# Patient Record
Sex: FEMALE | Race: WHITE | Employment: FULL TIME | URBAN - METROPOLITAN AREA
[De-identification: names, ages, dates, MRNs, and addresses within clinical notes are randomized per-mention and may not be internally consistent; named-entity substitution may affect disease eponyms.]

---

## 2024-10-12 LAB — HBA1C MFR BLD HPLC: 12 %

## 2024-10-17 ENCOUNTER — HOSPITAL ENCOUNTER (EMERGENCY)
Facility: HOSPITAL | Age: 34
Discharge: HOME | End: 2024-10-18
Attending: EMERGENCY MEDICINE
Payer: COMMERCIAL

## 2024-10-17 DIAGNOSIS — U07.1 COVID-19: ICD-10-CM

## 2024-10-17 DIAGNOSIS — E11.65 HYPERGLYCEMIA DUE TO DIABETES MELLITUS (HCC): Primary | ICD-10-CM

## 2024-10-17 LAB
BASOPHILS # BLD: 0 K/UL (ref 0–0.1)
BASOPHILS NFR BLD: 0 % (ref 0–1)
DIFFERENTIAL METHOD BLD: ABNORMAL
EOSINOPHIL # BLD: 0.1 K/UL (ref 0–0.4)
EOSINOPHIL NFR BLD: 4 % (ref 0–7)
ERYTHROCYTE [DISTWIDTH] IN BLOOD BY AUTOMATED COUNT: 12.2 % (ref 11.5–14.5)
GLUCOSE BLD STRIP.AUTO-MCNC: 551 MG/DL (ref 65–117)
HCT VFR BLD AUTO: 37.7 % (ref 35–47)
HGB BLD-MCNC: 13.1 G/DL (ref 11.5–16)
IMM GRANULOCYTES # BLD AUTO: 0 K/UL (ref 0–0.04)
IMM GRANULOCYTES NFR BLD AUTO: 0 % (ref 0–0.5)
LYMPHOCYTES # BLD: 1.7 K/UL (ref 0.8–3.5)
LYMPHOCYTES NFR BLD: 45 % (ref 12–49)
MCH RBC QN AUTO: 29 PG (ref 26–34)
MCHC RBC AUTO-ENTMCNC: 34.7 G/DL (ref 30–36.5)
MCV RBC AUTO: 83.4 FL (ref 80–99)
MONOCYTES # BLD: 0.3 K/UL (ref 0–1)
MONOCYTES NFR BLD: 8 % (ref 5–13)
NEUTS SEG # BLD: 1.6 K/UL (ref 1.8–8)
NEUTS SEG NFR BLD: 43 % (ref 32–75)
NRBC # BLD: 0 K/UL (ref 0–0.01)
NRBC BLD-RTO: 0 PER 100 WBC
PLATELET # BLD AUTO: 298 K/UL (ref 150–400)
PMV BLD AUTO: 9.6 FL (ref 8.9–12.9)
RBC # BLD AUTO: 4.52 M/UL (ref 3.8–5.2)
SERVICE CMNT-IMP: ABNORMAL
WBC # BLD AUTO: 3.8 K/UL (ref 3.6–11)

## 2024-10-17 PROCEDURE — 87636 SARSCOV2 & INF A&B AMP PRB: CPT

## 2024-10-17 PROCEDURE — 36415 COLL VENOUS BLD VENIPUNCTURE: CPT

## 2024-10-17 PROCEDURE — 82962 GLUCOSE BLOOD TEST: CPT

## 2024-10-17 PROCEDURE — 85025 COMPLETE CBC W/AUTO DIFF WBC: CPT

## 2024-10-17 PROCEDURE — 80053 COMPREHEN METABOLIC PANEL: CPT

## 2024-10-17 PROCEDURE — 99284 EMERGENCY DEPT VISIT MOD MDM: CPT

## 2024-10-17 RX ORDER — 0.9 % SODIUM CHLORIDE 0.9 %
500 INTRAVENOUS SOLUTION INTRAVENOUS ONCE
Status: COMPLETED | OUTPATIENT
Start: 2024-10-17 | End: 2024-10-18

## 2024-10-17 ASSESSMENT — PAIN SCALES - GENERAL: PAINLEVEL_OUTOF10: 0

## 2024-10-18 VITALS
BODY MASS INDEX: 24.8 KG/M2 | TEMPERATURE: 98.1 F | DIASTOLIC BLOOD PRESSURE: 65 MMHG | HEIGHT: 63 IN | SYSTOLIC BLOOD PRESSURE: 97 MMHG | OXYGEN SATURATION: 97 % | WEIGHT: 140 LBS | RESPIRATION RATE: 17 BRPM | HEART RATE: 82 BPM

## 2024-10-18 LAB
ALBUMIN SERPL-MCNC: 3.7 G/DL (ref 3.5–5)
ALBUMIN/GLOB SERPL: 1.2 (ref 1.1–2.2)
ALP SERPL-CCNC: 54 U/L (ref 45–117)
ALT SERPL-CCNC: 14 U/L (ref 12–78)
ANION GAP SERPL CALC-SCNC: 4 MMOL/L (ref 2–12)
APPEARANCE UR: CLEAR
AST SERPL-CCNC: 8 U/L (ref 15–37)
BACTERIA URNS QL MICRO: NEGATIVE /HPF
BILIRUB SERPL-MCNC: 0.3 MG/DL (ref 0.2–1)
BILIRUB UR QL: NEGATIVE
BUN SERPL-MCNC: 8 MG/DL (ref 6–20)
BUN/CREAT SERPL: 9 (ref 12–20)
CALCIUM SERPL-MCNC: 9.1 MG/DL (ref 8.5–10.1)
CHLORIDE SERPL-SCNC: 98 MMOL/L (ref 97–108)
CO2 SERPL-SCNC: 30 MMOL/L (ref 21–32)
COLOR UR: ABNORMAL
CREAT SERPL-MCNC: 0.9 MG/DL (ref 0.55–1.02)
EPITH CASTS URNS QL MICRO: ABNORMAL /LPF
FLUAV RNA SPEC QL NAA+PROBE: NOT DETECTED
FLUBV RNA SPEC QL NAA+PROBE: NOT DETECTED
GLOBULIN SER CALC-MCNC: 3.2 G/DL (ref 2–4)
GLUCOSE BLD STRIP.AUTO-MCNC: 222 MG/DL (ref 65–117)
GLUCOSE BLD STRIP.AUTO-MCNC: 513 MG/DL (ref 65–117)
GLUCOSE SERPL-MCNC: 571 MG/DL (ref 65–100)
GLUCOSE UR STRIP.AUTO-MCNC: >1000 MG/DL
HCG UR QL: NEGATIVE
HGB UR QL STRIP: NEGATIVE
KETONES UR QL STRIP.AUTO: NEGATIVE MG/DL
LEUKOCYTE ESTERASE UR QL STRIP.AUTO: NEGATIVE
NITRITE UR QL STRIP.AUTO: NEGATIVE
PH UR STRIP: 8 (ref 5–8)
POTASSIUM SERPL-SCNC: 4.3 MMOL/L (ref 3.5–5.1)
PROT SERPL-MCNC: 6.9 G/DL (ref 6.4–8.2)
PROT UR STRIP-MCNC: NEGATIVE MG/DL
RBC #/AREA URNS HPF: ABNORMAL /HPF (ref 0–5)
SARS-COV-2 RNA RESP QL NAA+PROBE: DETECTED
SERVICE CMNT-IMP: ABNORMAL
SERVICE CMNT-IMP: ABNORMAL
SODIUM SERPL-SCNC: 132 MMOL/L (ref 136–145)
SOURCE: ABNORMAL
SP GR UR REFRACTOMETRY: 1.01 (ref 1–1.03)
URINE CULTURE IF INDICATED: ABNORMAL
UROBILINOGEN UR QL STRIP.AUTO: 0.2 EU/DL (ref 0.2–1)
WBC URNS QL MICRO: ABNORMAL /HPF (ref 0–4)

## 2024-10-18 PROCEDURE — 96361 HYDRATE IV INFUSION ADD-ON: CPT

## 2024-10-18 PROCEDURE — 6370000000 HC RX 637 (ALT 250 FOR IP): Performed by: EMERGENCY MEDICINE

## 2024-10-18 PROCEDURE — 81025 URINE PREGNANCY TEST: CPT

## 2024-10-18 PROCEDURE — 82962 GLUCOSE BLOOD TEST: CPT

## 2024-10-18 PROCEDURE — 96374 THER/PROPH/DIAG INJ IV PUSH: CPT

## 2024-10-18 PROCEDURE — 2580000003 HC RX 258: Performed by: EMERGENCY MEDICINE

## 2024-10-18 PROCEDURE — 81001 URINALYSIS AUTO W/SCOPE: CPT

## 2024-10-18 RX ADMIN — SODIUM CHLORIDE 500 ML: 9 INJECTION, SOLUTION INTRAVENOUS at 00:23

## 2024-10-18 RX ADMIN — INSULIN HUMAN 10 UNITS: 100 INJECTION, SOLUTION PARENTERAL at 00:24

## 2024-10-18 ASSESSMENT — ENCOUNTER SYMPTOMS
VOMITING: 0
NAUSEA: 0
ABDOMINAL PAIN: 0
DIARRHEA: 0
SHORTNESS OF BREATH: 0

## 2024-10-18 NOTE — DISCHARGE INSTRUCTIONS
It was a pleasure taking care of you in our Emergency Department today.  We know that when you come to [facility], you are entrusting us with your health, comfort, and safety.  Our physicians and nurses honor that trust, and truly appreciate the opportunity to care for you and your loved ones.      We also value your feedback.  If you receive a survey about your Emergency Department experience today, please fill it out.  We care about our patients' feedback, and we listen to what you have to say.  Thank you!      Dr. Luz Maria Loving MD.

## 2024-10-18 NOTE — ED PROVIDER NOTES
EMERGENCY DEPARTMENT HISTORY AND PHYSICAL EXAM     ----------------------------------------------------------------------------  Please note that this dictation was completed with [employer], the computer voice recognition software.  Quite often unanticipated grammatical, syntax, homophones, and other interpretive errors are inadvertently transcribed by the computer software.  Please disregard these errors.  Please excuse any errors that have escaped final proofreading  ----------------------------------------------------------------------------      Date: 10/17/2024  Patient Name: Kailyn Salcido      HISTORY OF PRESENT ILLNESS     Chief Complaint   Patient presents with    Hyperglycemia     Pt arrives w cc of hyperglycemia, was dx with DM on Tuesday, unsure if type I or II. Pt had blood work done Tuesday, her A1C was 12, BG was 267, pt has been taking BG 3x daily at home, has not gotten a reading <311. Pt was Rx long acting insulin, last used around 2100. Pt stated she got a reading >600 this evening. Denies N/V/abd pain. Resp WDL       History obtainted from:  Patient    Other independent source of history: family    HPI: Kailyn Salcido is a 34 y.o. female, with significant pmhx of newly diagnosed diabetes, who presents via private vehicle  to the ED with c/o generalized fatigue with elevated blood sugar readings over 500 at home.  Was given long-acting insulin by her primary care doctor but no short acting.  Denies nausea, vomiting, chest pain or shortness of breath.  Reports increased thirst with Baez urea as well      PCP: Lona Corcoran MD    Allergy List:   Allergies   Allergen Reactions    Sulfa Antibiotics Hives         CURRENT MEDICATIONS      Discharge Medication List as of 10/18/2024  1:56 AM        CONTINUE these medications which have NOT CHANGED    Details   bismuth subsalicylate (PEPTO BISMOL) 262 MG/15ML suspension Take 30 mLs by mouth every 4 hours as neededHistorical Med      busPIRone

## 2024-11-06 ENCOUNTER — OFFICE VISIT (OUTPATIENT)
Age: 34
End: 2024-11-06
Payer: COMMERCIAL

## 2024-11-06 DIAGNOSIS — E11.65 TYPE 2 DIABETES MELLITUS WITH HYPERGLYCEMIA, UNSPECIFIED WHETHER LONG TERM INSULIN USE (HCC): Primary | ICD-10-CM

## 2024-11-06 PROCEDURE — G0108 DIAB MANAGE TRN  PER INDIV: HCPCS

## 2024-11-11 ENCOUNTER — TELEPHONE (OUTPATIENT)
Age: 34
End: 2024-11-11

## 2024-11-11 NOTE — TELEPHONE ENCOUNTER
----- Message from Dr. Lona Romero MD sent at 11/10/2024  7:38 PM EST -----  Patient needs appointment last seen in 2022 please check if still my patient  ----- Message -----  From: Paige Ndiaye RN  Sent: 11/7/2024   5:04 PM EST  To: Lona Romero MD

## 2024-11-13 ENCOUNTER — TELEPHONE (OUTPATIENT)
Age: 34
End: 2024-11-13

## 2024-11-27 ENCOUNTER — TELEPHONE (OUTPATIENT)
Age: 34
End: 2024-11-27

## 2024-11-27 NOTE — TELEPHONE ENCOUNTER
LVM informing pt Dr. Rivera has sent you a [employer] message that you have not yet read. Please read this as soon as possible and if you are unable to get into [employer] to read the message then please call me back and I'm happy to read the message to you.

## 2024-12-17 ENCOUNTER — OFFICE VISIT (OUTPATIENT)
Age: 34
End: 2024-12-17
Payer: COMMERCIAL

## 2024-12-17 VITALS
HEIGHT: 63 IN | WEIGHT: 125.4 LBS | BODY MASS INDEX: 22.22 KG/M2 | HEART RATE: 87 BPM | DIASTOLIC BLOOD PRESSURE: 60 MMHG | SYSTOLIC BLOOD PRESSURE: 94 MMHG

## 2024-12-17 DIAGNOSIS — E13.9 TYPE 1.5 DIABETES, MANAGED AS TYPE 1 (HCC): Primary | ICD-10-CM

## 2024-12-17 PROCEDURE — 3046F HEMOGLOBIN A1C LEVEL >9.0%: CPT | Performed by: INTERNAL MEDICINE

## 2024-12-17 PROCEDURE — 2022F DILAT RTA XM EVC RTNOPTHY: CPT | Performed by: INTERNAL MEDICINE

## 2024-12-17 PROCEDURE — G8427 DOCREV CUR MEDS BY ELIG CLIN: HCPCS | Performed by: INTERNAL MEDICINE

## 2024-12-17 PROCEDURE — G8484 FLU IMMUNIZE NO ADMIN: HCPCS | Performed by: INTERNAL MEDICINE

## 2024-12-17 PROCEDURE — G8420 CALC BMI NORM PARAMETERS: HCPCS | Performed by: INTERNAL MEDICINE

## 2024-12-17 PROCEDURE — 95251 CONT GLUC MNTR ANALYSIS I&R: CPT | Performed by: INTERNAL MEDICINE

## 2024-12-17 PROCEDURE — 99205 OFFICE O/P NEW HI 60 MIN: CPT | Performed by: INTERNAL MEDICINE

## 2024-12-17 PROCEDURE — 1036F TOBACCO NON-USER: CPT | Performed by: INTERNAL MEDICINE

## 2024-12-17 RX ORDER — ACYCLOVIR 400 MG/1
TABLET ORAL
Qty: 9 EACH | Refills: 3 | Status: SHIPPED | OUTPATIENT
Start: 2024-12-17

## 2024-12-17 RX ORDER — INSULIN GLARGINE 100 [IU]/ML
INJECTION, SOLUTION SUBCUTANEOUS
COMMUNITY

## 2024-12-17 RX ORDER — INSULIN LISPRO 100 [IU]/ML
INJECTION, SOLUTION INTRAVENOUS; SUBCUTANEOUS 3 TIMES DAILY
COMMUNITY

## 2024-12-17 NOTE — PATIENT INSTRUCTIONS
1) Your overall control looks pretty good but I am concerned your lantus may be a little more than you need so decrease your dose to 14 units at night and see if this keeps you  in the morning.  If you have 2 or more readings under 80 in a week in the morning, then decrease your lantus to 13 units.      2) Try dosing humalog 1 unit for 15 grams of carbs 5-10 minutes before you eat to prevent your sugar from going up.  Check your dexcom at 2 hours after you eat and as long as it's staying under 180, then this is a good dose of humalog to cover your food.  If you don't eat at least 30 grams of carbs, then you don't need to take any humalog unless your sugar is over 180 and then follow the scale below:    Blood sugar  Insulin dose          181-240  2 units  241-300  3 units  301-360  4 units  361-420  5 units  Over 420  6 units          3) Now that you can share your dexcom data with me, let me know over [employer] if you want me to look at your data and I can write back to you with any changes if having lows under 80 more than 2 times a week or you are going over 250 3-4 times or more a week.      4) Let me know when you need refills.    5) We will just draw a Hemoglobin A1c in the office next time and you won't need any additional labs.

## 2024-12-17 NOTE — PROGRESS NOTES
under 80 in a week in the morning, then decrease your lantus to 13 units.      2) Try dosing humalog 1 unit for 15 grams of carbs 5-10 minutes before you eat to prevent your sugar from going up.  Check your dexcom at 2 hours after you eat and as long as it's staying under 180, then this is a good dose of humalog to cover your food.  If you don't eat at least 30 grams of carbs, then you don't need to take any humalog unless your sugar is over 180 and then follow the scale below:    Blood sugar  Insulin dose          181-240  2 units  241-300  3 units  301-360  4 units  361-420  5 units  Over 420  6 units          3) Now that you can share your dexcom data with me, let me know over [employer] if you want me to look at your data and I can write back to you with any changes if having lows under 80 more than 2 times a week or you are going over 250 3-4 times or more a week.      4) Let me know when you need refills.    5) We will just draw a Hemoglobin A1c in the office next time and you won't need any additional labs.            Return 2/28/25 at 12:10pm.    I spent a total of 62 minutes in both face-to-face and in non-face-to-face activities for the visit on the date of this encounter.        Copy sent to:  Wendy Malik APRN - NP

## 2024-12-20 ENCOUNTER — OFFICE VISIT (OUTPATIENT)
Age: 34
End: 2024-12-20
Payer: COMMERCIAL

## 2024-12-20 DIAGNOSIS — E11.65 TYPE 2 DIABETES MELLITUS WITH HYPERGLYCEMIA, UNSPECIFIED WHETHER LONG TERM INSULIN USE (HCC): Primary | ICD-10-CM

## 2024-12-20 PROCEDURE — G0108 DIAB MANAGE TRN  PER INDIV: HCPCS

## 2024-12-20 NOTE — PROGRESS NOTES
[facility]  Diabetes Self-Management Education & Support Program  Encounter Note      SUMMARY  Diabetes self-care management training was completed related to healthy eating: carb counting. The participant will return on January 17 to continue DSMES related to reducing risks and taking medications. The participant did identify SMART Goal(s) and will practice knowledge and skills related to healthy eating to improve diabetes self-management.      EVALUATION:  Ms. Salcido can recognize carbohydrates, proteins, and fats using food models. She can verbalize reading a food label. She understands and can verbalize basic carb counting. She verbalized understanding of ADA recommendations for alcohol use. She can verbalize hypoglycemia protocols/Rule of 15. Per provider, participant diagnosed as T1DM. Diabetes educational materials provided to participant.    RECOMMENDATIONS:  Practice reading food labels  Measure food portions for meals  Work on SMART goal  Continue to take DM meds/insulin as prescribed    TOPICS DISCUSSED TODAY:  WHAT CAN I EAT? 60    Next provider visit is scheduled for   Future Appointments         Provider Specialty Dept Phone    1/17/2025 3:45 PM Paige Ndiaye RN Diabetes Services [phone]    2/28/2025 12:10 PM Isaias Rivera MD Endocrinology [phone]              SMART GOAL(S)  Practice healthy eating self-care behavior by counting carbs for each meal, daily,  over the next week.  ACHIEVEMENT OF GOAL(S) : 25-49%       DATE DSMES TOPIC EVALUATION     12/20/2024 WHAT CAN I EAT?     Nutritional terms & tools    Carbohydrate Counting   Reading food labels   Eating in [employer]  ADA alcohol recommendations  Free apps   Prescribed Carb ratio, Sensitivity/Correction ratio  Pregnancy recommendations   Maintaining A1c/BG targets per provider recommendation  Eye exam per provider recommendation     The participant   Reads food labels in choosing acceptable foods:

## 2025-01-01 RX ORDER — INSULIN GLARGINE 100 [IU]/ML
INJECTION, SOLUTION SUBCUTANEOUS
Qty: 15 ML | Refills: 3 | Status: SHIPPED | OUTPATIENT
Start: 2025-01-01

## 2025-01-16 RX ORDER — INSULIN GLARGINE 100 [IU]/ML
INJECTION, SOLUTION SUBCUTANEOUS
Qty: 15 ML | Refills: 3 | Status: SHIPPED | OUTPATIENT
Start: 2025-01-16

## 2025-01-20 DIAGNOSIS — E13.9 TYPE 1.5 DIABETES, MANAGED AS TYPE 1 (HCC): Primary | ICD-10-CM

## 2025-01-20 RX ORDER — ACYCLOVIR 400 MG/1
TABLET ORAL
Qty: 9 EACH | Refills: 3 | Status: SHIPPED | OUTPATIENT
Start: 2025-01-20

## 2025-01-23 ENCOUNTER — TELEPHONE (OUTPATIENT)
Age: 35
End: 2025-01-23

## 2025-01-23 NOTE — TELEPHONE ENCOUNTER
PA initiated through [email or web address] for  Dexcom G7 sensor    Dexcom G7 sensor approved 01/23/2025 - 01/23/2026 PA #: [id number]. Pt notified via [employer].

## 2025-02-10 RX ORDER — INSULIN LISPRO 100 [IU]/ML
INJECTION, SOLUTION INTRAVENOUS; SUBCUTANEOUS
Qty: 15 ML | Refills: 11 | Status: SHIPPED | OUTPATIENT
Start: 2025-02-10

## 2025-02-28 ENCOUNTER — OFFICE VISIT (OUTPATIENT)
Age: 35
End: 2025-02-28

## 2025-02-28 VITALS
WEIGHT: 123.2 LBS | BODY MASS INDEX: 21.83 KG/M2 | HEART RATE: 84 BPM | SYSTOLIC BLOOD PRESSURE: 94 MMHG | DIASTOLIC BLOOD PRESSURE: 65 MMHG | HEIGHT: 63 IN

## 2025-02-28 DIAGNOSIS — E13.9 TYPE 1.5 DIABETES, MANAGED AS TYPE 1 (HCC): Primary | ICD-10-CM

## 2025-02-28 LAB — HBA1C MFR BLD: 5.8 %

## 2025-02-28 NOTE — PATIENT INSTRUCTIONS
1) Your Hemoglobin A1c (3 month test of blood sugar) is excellent at 5.8%.  Keep up the good work!    2) If you miss your lantus, just take 6 units the next morning and then get back on track with 13 at night.    3) Your carb ratio looks good at 1:20 for the most part.  See if you do tend to spike at night despite counting correctly, then you may want to do 1:15 for dinner and 1:20 for the rest of the day.    4) If you want to consider a pump, check out Omnipod, Tandem and Medtronic as the 3 main options.  The first 2 interface with dexcom.  The Medtronic has it's own sensor that talks to the pump.  Let me know if you want to move forward with a pump exploration class or starting one of these in the future by writing over [employer].

## 2025-02-28 NOTE — PROGRESS NOTES
Chief Complaint   Patient presents with    Diabetes     PCP and pharmacy confirmed  Pt consented to use of ANABEL     History of Present Illness: Kailyn Salcido is a 34 y.o. female here for follow up of diabetes.  Weight down 2 lbs since last visit in 12/24.  Review of her dexcom data over the past 90 days shows 79% in range, 16% high, 4% very high, 1% low and <1% very low.    she has the following indications to continue treatment with Dexcom:  1) she has type 1 diabetes (E10.65) and is on an intensive insulin regimen with 4 injections per day  2) she tests her blood sugar 4 times per day and makes treatment decisions off her blood sugar readings and sensor readings  3) she requires frequent adjustments to her insulin injection doses based on her sensor readings  4) she has benefited from therapeutic continuous glucose monitoring and I recommend that she continue with this  5) she is seen in my office every 3-6 months          History of Present Illness  The patient is a 34-year-old female who is here for follow-up of diabetes.    She has been managing her diabetes with a regimen of Lantus, administered at a dosage of 13 units at bedtime. She was previously advised to reduce her Lantus dosage from 14 to 13 units due to episodes of hypoglycemia, but she has maintained the 13-unit dosage. Additionally, she is on Humalog, with a dosage of 1 unit per 20 carbohydrates. Initially, she was on a regimen of 1 unit per 15 carbohydrates, but this was causing rapid drops in her blood sugar levels, prompting the adjustment to 1 unit per 20 carbohydrates. She reports occasional postprandial hyperglycemia, which she attributes to late-night meals following her work schedule that typically ends around 8 or 9 PM. She also acknowledges occasional miscalculations in her carbohydrate counting, particularly with vegetables. There was an instance where she missed her Lantus dose the previous night. Despite these challenges, she reports

## 2025-04-13 ENCOUNTER — PATIENT MESSAGE (OUTPATIENT)
Age: 35
End: 2025-04-13

## 2025-04-15 RX ORDER — PEN NEEDLE, DIABETIC 32GX 5/32"
NEEDLE, DISPOSABLE MISCELLANEOUS
Qty: 400 EACH | Refills: 3 | Status: SHIPPED | OUTPATIENT
Start: 2025-04-15

## 2025-08-20 LAB
ALBUMIN SERPL-MCNC: 4.7 G/DL (ref 3.9–4.9)
ALBUMIN/CREAT UR: 5 MG/G CREAT (ref 0–29)
ALP SERPL-CCNC: 35 IU/L (ref 44–121)
ALT SERPL-CCNC: 8 IU/L (ref 0–32)
AST SERPL-CCNC: 16 IU/L (ref 0–40)
BILIRUB SERPL-MCNC: 0.5 MG/DL (ref 0–1.2)
BUN SERPL-MCNC: 10 MG/DL (ref 6–20)
BUN/CREAT SERPL: 12 (ref 9–23)
CALCIUM SERPL-MCNC: 9.5 MG/DL (ref 8.7–10.2)
CHLORIDE SERPL-SCNC: 99 MMOL/L (ref 96–106)
CHOLEST SERPL-MCNC: 175 MG/DL (ref 100–199)
CO2 SERPL-SCNC: 21 MMOL/L (ref 20–29)
CREAT SERPL-MCNC: 0.81 MG/DL (ref 0.57–1)
CREAT UR-MCNC: 166.2 MG/DL
EGFRCR SERPLBLD CKD-EPI 2021: 98 ML/MIN/1.73
EST. AVERAGE GLUCOSE BLD GHB EST-MCNC: 114 MG/DL
GLOBULIN SER CALC-MCNC: 2.3 G/DL (ref 1.5–4.5)
GLUCOSE SERPL-MCNC: 92 MG/DL (ref 70–99)
HBA1C MFR BLD: 5.6 % (ref 4.8–5.6)
HDLC SERPL-MCNC: 65 MG/DL
IMP & REVIEW OF LAB RESULTS: NORMAL
LDLC SERPL CALC-MCNC: 101 MG/DL (ref 0–99)
MICROALBUMIN UR-MCNC: 9 UG/ML
POTASSIUM SERPL-SCNC: 4.3 MMOL/L (ref 3.5–5.2)
PROT SERPL-MCNC: 7 G/DL (ref 6–8.5)
SODIUM SERPL-SCNC: 135 MMOL/L (ref 134–144)
TRIGL SERPL-MCNC: 44 MG/DL (ref 0–149)
TSH SERPL DL<=0.005 MIU/L-ACNC: 4.03 UIU/ML (ref 0.45–4.5)
VLDLC SERPL CALC-MCNC: 9 MG/DL (ref 5–40)

## 2025-08-24 DIAGNOSIS — E13.9 TYPE 1.5 DIABETES, MANAGED AS TYPE 1 (HCC): ICD-10-CM

## 2025-09-01 RX ORDER — INSULIN GLARGINE 100 [IU]/ML
INJECTION, SOLUTION SUBCUTANEOUS
Qty: 15 ML | Refills: 3 | Status: SHIPPED | OUTPATIENT
Start: 2025-09-01 | End: 2025-09-05 | Stop reason: DRUGHIGH

## 2025-09-01 RX ORDER — INSULIN LISPRO 100 [IU]/ML
INJECTION, SOLUTION INTRAVENOUS; SUBCUTANEOUS
Qty: 15 ML | Refills: 11 | Status: SHIPPED | OUTPATIENT
Start: 2025-09-01 | End: 2025-09-05 | Stop reason: DRUGHIGH

## 2025-09-05 ENCOUNTER — OFFICE VISIT (OUTPATIENT)
Age: 35
End: 2025-09-05

## 2025-09-05 VITALS
BODY MASS INDEX: 22.15 KG/M2 | HEIGHT: 63 IN | HEART RATE: 62 BPM | RESPIRATION RATE: 16 BRPM | DIASTOLIC BLOOD PRESSURE: 62 MMHG | WEIGHT: 125 LBS | SYSTOLIC BLOOD PRESSURE: 97 MMHG

## 2025-09-05 DIAGNOSIS — E13.9 TYPE 1.5 DIABETES, MANAGED AS TYPE 1 (HCC): Primary | ICD-10-CM

## 2025-09-05 DIAGNOSIS — R94.6 BORDERLINE ABNORMAL THYROID FUNCTION TEST: ICD-10-CM

## 2025-09-05 RX ORDER — INSULIN PMP CART,AUT,G6/7,CNTR
EACH SUBCUTANEOUS
Qty: 10 EACH | Refills: 11 | Status: SHIPPED | OUTPATIENT
Start: 2025-09-05

## 2025-09-05 RX ORDER — INSULIN GLARGINE 100 [IU]/ML
INJECTION, SOLUTION SUBCUTANEOUS
Qty: 15 ML | Refills: 3 | Status: SHIPPED | OUTPATIENT
Start: 2025-09-05

## 2025-09-05 RX ORDER — INSULIN PMP CART,AUT,G6/7,CNTR
EACH SUBCUTANEOUS
Qty: 1 KIT | Refills: 0 | Status: SHIPPED | OUTPATIENT
Start: 2025-09-05

## 2025-09-05 RX ORDER — INSULIN LISPRO 100 [IU]/ML
INJECTION, SOLUTION INTRAVENOUS; SUBCUTANEOUS
Qty: 15 ML | Refills: 11 | Status: SHIPPED | OUTPATIENT
Start: 2025-09-05